# Patient Record
Sex: MALE | Race: WHITE | NOT HISPANIC OR LATINO | URBAN - METROPOLITAN AREA
[De-identification: names, ages, dates, MRNs, and addresses within clinical notes are randomized per-mention and may not be internally consistent; named-entity substitution may affect disease eponyms.]

---

## 2024-09-20 PROBLEM — Z00.00 ENCOUNTER FOR PREVENTIVE HEALTH EXAMINATION: Status: ACTIVE | Noted: 2024-09-20

## 2024-09-23 ENCOUNTER — OUTPATIENT (OUTPATIENT)
Dept: OUTPATIENT SERVICES | Facility: HOSPITAL | Age: 64
LOS: 1 days | End: 2024-09-23
Payer: MEDICARE

## 2024-09-23 ENCOUNTER — APPOINTMENT (OUTPATIENT)
Dept: MRI IMAGING | Facility: CLINIC | Age: 64
End: 2024-09-23
Payer: MEDICARE

## 2024-09-23 DIAGNOSIS — Z00.8 ENCOUNTER FOR OTHER GENERAL EXAMINATION: ICD-10-CM

## 2024-09-23 PROCEDURE — 76498 UNLISTED MR PROCEDURE: CPT

## 2024-09-23 PROCEDURE — 72197 MRI PELVIS W/O & W/DYE: CPT | Mod: MH

## 2024-09-23 PROCEDURE — 72197 MRI PELVIS W/O & W/DYE: CPT | Mod: 26,MH

## 2024-09-23 PROCEDURE — 76498P: CUSTOM | Mod: 26,MH

## 2024-09-23 PROCEDURE — A9585: CPT

## 2024-09-30 PROBLEM — Z98.890 HISTORY OF RADIOFREQUENCY ABLATION PROCEDURE FOR CARDIAC ARRHYTHMIA: Status: ACTIVE | Noted: 2024-09-30

## 2024-09-30 PROBLEM — N28.9 RENAL INSUFFICIENCY: Status: ACTIVE | Noted: 2024-09-30

## 2024-09-30 PROBLEM — N40.1 BPH WITH OBSTRUCTION/LOWER URINARY TRACT SYMPTOMS: Status: ACTIVE | Noted: 2024-09-30

## 2024-09-30 PROBLEM — I48.91 AFIB: Status: ACTIVE | Noted: 2024-09-30

## 2024-09-30 PROBLEM — Z80.6 FAMILY HISTORY OF LEUKEMIA: Status: ACTIVE | Noted: 2024-09-30

## 2024-09-30 PROBLEM — Z78.9 SOCIAL ALCOHOL USE: Status: ACTIVE | Noted: 2024-09-30

## 2024-10-01 ENCOUNTER — APPOINTMENT (OUTPATIENT)
Dept: INTERVENTIONAL RADIOLOGY/VASCULAR | Facility: CLINIC | Age: 64
End: 2024-10-01

## 2024-10-01 DIAGNOSIS — K21.9 GASTRO-ESOPHAGEAL REFLUX DISEASE W/OUT ESOPHAGITIS: ICD-10-CM

## 2024-10-01 DIAGNOSIS — N13.8 BENIGN PROSTATIC HYPERPLASIA WITH LOWER URINARY TRACT SYMPMS: ICD-10-CM

## 2024-10-01 DIAGNOSIS — N40.1 BENIGN PROSTATIC HYPERPLASIA WITH LOWER URINARY TRACT SYMPMS: ICD-10-CM

## 2024-10-01 DIAGNOSIS — E11.9 TYPE 2 DIABETES MELLITUS W/OUT COMPLICATIONS: ICD-10-CM

## 2024-10-01 DIAGNOSIS — Z79.01 LONG TERM (CURRENT) USE OF ANTICOAGULANTS: ICD-10-CM

## 2024-10-01 DIAGNOSIS — Z80.6 FAMILY HISTORY OF LEUKEMIA: ICD-10-CM

## 2024-10-01 DIAGNOSIS — Z98.890 OTHER SPECIFIED POSTPROCEDURAL STATES: ICD-10-CM

## 2024-10-01 DIAGNOSIS — I48.91 UNSPECIFIED ATRIAL FIBRILLATION: ICD-10-CM

## 2024-10-01 DIAGNOSIS — I10 ESSENTIAL (PRIMARY) HYPERTENSION: ICD-10-CM

## 2024-10-01 DIAGNOSIS — N28.9 DISORDER OF KIDNEY AND URETER, UNSPECIFIED: ICD-10-CM

## 2024-10-01 DIAGNOSIS — R33.9 RETENTION OF URINE, UNSPECIFIED: ICD-10-CM

## 2024-10-01 DIAGNOSIS — Z78.9 OTHER SPECIFIED HEALTH STATUS: ICD-10-CM

## 2024-10-01 DIAGNOSIS — Z97.8 PRESENCE OF OTHER SPECIFIED DEVICES: ICD-10-CM

## 2024-10-01 RX ORDER — SULFAMETHOXAZOLE AND TRIMETHOPRIM 400; 80 MG/1; MG/1
TABLET ORAL
Refills: 0 | Status: ACTIVE | COMMUNITY

## 2024-10-01 RX ORDER — OMEPRAZOLE 20 MG/1
20 TABLET, DELAYED RELEASE ORAL
Refills: 0 | Status: ACTIVE | COMMUNITY

## 2024-10-01 RX ORDER — SILDENAFIL 50 MG/1
50 TABLET ORAL
Refills: 0 | Status: ACTIVE | COMMUNITY

## 2024-10-01 RX ORDER — TAMSULOSIN HYDROCHLORIDE 0.4 MG/1
0.4 CAPSULE ORAL
Refills: 0 | Status: ACTIVE | COMMUNITY

## 2024-10-01 RX ORDER — APIXABAN 5 MG/1
5 TABLET, FILM COATED ORAL
Refills: 0 | Status: ACTIVE | COMMUNITY

## 2024-10-01 RX ORDER — SOLIFENACIN SUCCINATE 5 MG/1
5 TABLET ORAL
Refills: 0 | Status: ACTIVE | COMMUNITY

## 2024-10-01 RX ORDER — AMLODIPINE BESYLATE AND BENAZEPRIL HYDROCHLORIDE 10; 40 MG/1; MG/1
10-40 CAPSULE ORAL
Refills: 0 | Status: ACTIVE | COMMUNITY

## 2024-10-01 RX ORDER — SITAGLIPTIN 100 MG/1
100 TABLET, FILM COATED ORAL
Refills: 0 | Status: ACTIVE | COMMUNITY

## 2024-10-01 RX ORDER — METFORMIN HYDROCHLORIDE 500 MG/1
500 TABLET, COATED ORAL
Refills: 0 | Status: ACTIVE | COMMUNITY

## 2024-10-01 RX ORDER — METOPROLOL SUCCINATE 100 MG/1
100 TABLET, EXTENDED RELEASE ORAL
Refills: 0 | Status: ACTIVE | COMMUNITY

## 2024-10-01 NOTE — DATA REVIEWED
[FreeTextEntry1] : [facility] Imaging at [facility] of [facility] Department of Radiology Radiology Report Patient Name:	TRACY BOTELLO		Report Date:	24-Sep-2024 11:30.00 Patient ID:	[id number] ([id number]), [id number] ([id number])		Accession No.:	[id number] Patient Birth Date:	1960		Report Status:	F Referring Physician:	[id number] GOLDBERG GARY D		Reason For Study:	eval   EXAM: [id number] - MR CAD PROSTATE - ORDERED BY: GARY D GOLDBERG  EXAM: [id number] - MR PROSTATE WAW IC - ORDERED BY: GARY D GOLDBERG   PROCEDURE DATE: 09/23/2024  INTERPRETATION: CLINICAL INFORMATION: Elevated PSA. PSA: Unknown ng/mL Prior prostate biopsy: Unknown  COMPARISON: None.  CONTRAST/COMPLICATIONS: IV Contrast: Gadavist 10 cc administered 0 cc discarded Oral Contrast: NONE Complications: None reported at time of study completion  PROCEDURE: 3.0T multiparametric MRI of the pelvis was performed as per Prostate Cancer Protocol including small field-of-view, thin section T2-weighted imaging, diffusion-weighted imaging (including ultra high b-values), and dynamic contrast-enhanced imaging.  Computer aided detection/diagnosis was performed using kinetic enhancement analysis on a dedicated [employer] workstation performed by the interpreting radiologist.  FINDINGS:  Size: 6.3 x 5 x 6.9 [transverse x AP x CC] cm. Volume: 113.8 cc . PSA density: Unknown ng/mL/mL PSA density >0.15 ng/mL/mL: Unknown Hemorrhage: None.  Central gland: Central prostatic hypertrophy with intravesical protrusion of BPH. Peripheral zone: No focal finding. Multiple linear/wedge-shaped areas of T2 hypointense signal without associated restricted diffusion, likely inflammatory.  MRI Targets: No MRI targetable lesion.  Neurovascular bundle: No evidence of neurovascular bundle invasion. Seminal vesicles: No seminal vesicle invasion. Lymph nodes: No pelvic adenopathy. Bones: No suspicious lesions identified. Urinary bladder: Mucosal enhancement. Aragon catheter terminating in the bladder. Other: None.  IMPRESSION: No MRI targetable lesions.  Multiple linear/wedge-shaped areas of T2 hypointense signal without associated restricted diffusion, likely inflammatory. PIRADS 2 - Low (clinically significant cancer is unlikely to be present)  Prostate Volume: 113.8 mL  --- End of Report ---      AMMON CLARK DO; Resident Radiologist This document has been electronically signed. MARIA VICTORIA ANTONIO MD; Attending Radiologist This document has been electronically signed. Sep 24 2024 11:30AM

## 2024-10-01 NOTE — PHYSICAL EXAM
[Alert] : alert [No Acute Distress] : no acute distress [Well Nourished] : well nourished [No Respiratory Distress] : no respiratory distress [Oriented x3] : oriented to person, place, and time [Normal Insight/Judgement] : insight and judgment were intact [Normal Affect] : the affect was normal [Normal Mood] : the mood was normal [Recent Memory Normal] : recent memory was not impaired [Remote Memory Normal] : remote memory was not impaired [Restricted in physically strenuous activity but ambulatory and able to carry out work of a light or sedentary nature] : Restricted in physically strenuous activity but ambulatory and able to carry out work of a light or sedentary nature, e.g., light house work, office work

## 2024-10-01 NOTE — ASSESSMENT
[Other: _____] : [unfilled] [FreeTextEntry1] : Mr. MOLINA is a 63 y/o male with hx of symptomatic BPH presenting for PAE consultation.   Given Mr. MOLINA's urologic symptomatology secondary to BPH, performing Prostate Artery Embolization, a minimally invasive intravascular procedure, may result in significant improvement of his symptoms.   IPSS score was 14 prior to piña   # Symptomatic BPH with LUTS / Obstructive Symptoms - states BPH s/s now with retention since cardiac ablation in July are compromising his quality of life - 9/23/24 .8 cc volume - referred for outpatient PAE by Dr Goldberg - PAE procedural risks, benefits & alternatives were discussed at great length with Mr. MOLINA, inclusive of possible non-targeted embolization (bladder / penis / rectum), post embolization syndrome which may be comprised of flu-like s/s, low grade fever, n/v, pelvic discomfort lasting up to 1 week, penile tip burning after piña catheter removal which may last a few days or 1-2 episodes of gross hematuria with or without clot passage that may occur monthly as the prostate remodels - performed with sedation from the anesthesia team - Your LUTS may worsen the first week post PAE 2/2 the inflammatory phase of healing - You may also have transitory blood-tinged urine, semen or bowel movements initially -  You may also experience acute urinary retention which may require piña catheter reinsertion - 9/23/24 MRI to be reviewed to evaluate pelvic vasculature for PAE planning - You will be discharged home after a 1- 2 hr PACU stay - an access site closure device (MRI compatible permanent Celt or absorbable Mynx) may be utilized to aid in mitigating postprocedural access site bleeding - You will be discharged with an indwelling piña catheter with post op f/u & mgmt with your referring urologist - pt reports renal insufficiency hx- no Ibuprofen post op 2/2 a/c usage & renal insufficiency  - no Pyridium 2/2 renal insufficiency hx - no Medrol post op 2/2 DM hx - BPH medication regimen, PSA monitoring & repeat imaging post PAE as per your urologist - IR follow up consults with IPSS assessment s/p PAE will be 1 month post PAE - additional follow ups to be determined as clinically indicated   # Renal Insufficiency - baseline renal function: no labs available for review & pt unaware - managed by PCP Dr Trista Stevens - I reviewed the risk of ERICKSON and discussed prophylactic measures, including IV hydration, minimizing contrast administration, and adjustment of medication regimen - discussed approx 50 ml of iodinated contrast is utilized - will require ERICKSON prophylaxis parameters for IV hydration from Dr Stevens - may provide gentle IV hydration for ERICKSON prophylaxis - 0.9% NS @ 1 ml/kg/hr to be initiated   hr pre op through  hr post op pending PST lab results - bmp eval 48-72 hrs post procedure  # T2DM   - A1C eval as per PST - managed with Metformin & Januvia - briefly reviewed hold parameters for both - hold pre op dos - PST to discuss in depth - pre op medication dosing modification / hold as per PST / Managing MD - instructed to hold DM meds pre op dos - FBS pre op dos  # AFIB - denies cardiac symptomatology - managed by Dr Pantoja / Dr Gross  - hx of ablation 7/2024 - on Eliquis regimen - will discuss feasibility of holding Eliquis 48 hrs pre op to aid in mitigating bleeding risk with cardiologist - bkg office to request most recent cardiac eval & tests for review   # HTN - pre op antihypertensives as per PST  # GERD - on Omeprazole regimen - pre op dos GERD med(s) as per PST / Anesthesia    .& Mrs. MOLINA's comprehension was confirmed & all questions were asked & answered to their satisfaction. Mr. MOLINA expressed that he would like to move forward with PAE.   IR contact information was provided to Mr. MOLINA & he was informed that the IR booking office will contact him to schedule PSTs & PAE procedure.

## 2024-10-01 NOTE — HISTORY OF PRESENT ILLNESS
[Home] : at home, [unfilled] , at the time of the visit. [Medical Office: ([facility])___] : at the medical office located in  [Spouse] : spouse [Verbal consent obtained from patient] : the patient, [unfilled] [0] : ~His/Her~ pain was 0 out of 10 [FreeTextEntry1] : Mr. MOLINA is a 63 y/o male with hx of AFIB s/p ablation 7/2024 on Eliquis, T2DM, HTN, renal insufficiency, HLD, GERD, symptomatic BPH refractory to conservative management, now with retention since cardiac ablation, who presents to IR today for PAE consultation, as referred by Dr Goldberg.  Mr. MOLINA reports BPH s/s now with retention since cardiac ablation in July are compromising his quality of life. Prior to retention, had nocturia x 4, urgency, frequency, hesitancy. Next routine piña exchange is 10/4.  MRI Prostate 9/23/24: 113.8 cc volume   Dr Gary Goldberg / Dr Fidencio Cisse in [place] [phone] Cardiac Dr Gross [phone] Interventional Cardiac Dr Nikhil Pantoja [phone] PCP Dr Trista Stevens [phone] - manages global health, DM, renal function

## 2024-10-01 NOTE — ADDENDUM
[FreeTextEntry1] : 10/1/24 3:40 pm - per cardiac NP Ramiro from dr Pantoja's office, pt may hold Eliquis 48 hrs. IR & SS orders updated. JC MCNAMARA-BC

## 2024-10-03 ENCOUNTER — NON-APPOINTMENT (OUTPATIENT)
Age: 64
End: 2024-10-03

## 2024-10-08 ENCOUNTER — OUTPATIENT (OUTPATIENT)
Dept: OUTPATIENT SERVICES | Facility: HOSPITAL | Age: 64
LOS: 1 days | End: 2024-10-08
Payer: MEDICARE

## 2024-10-08 VITALS
OXYGEN SATURATION: 96 % | RESPIRATION RATE: 18 BRPM | DIASTOLIC BLOOD PRESSURE: 67 MMHG | HEIGHT: 74 IN | WEIGHT: 230.82 LBS | SYSTOLIC BLOOD PRESSURE: 100 MMHG | HEART RATE: 85 BPM | TEMPERATURE: 98 F

## 2024-10-08 DIAGNOSIS — E11.9 TYPE 2 DIABETES MELLITUS WITHOUT COMPLICATIONS: ICD-10-CM

## 2024-10-08 DIAGNOSIS — Z01.818 ENCOUNTER FOR OTHER PREPROCEDURAL EXAMINATION: ICD-10-CM

## 2024-10-08 DIAGNOSIS — Z98.890 OTHER SPECIFIED POSTPROCEDURAL STATES: Chronic | ICD-10-CM

## 2024-10-08 DIAGNOSIS — N40.1 BENIGN PROSTATIC HYPERPLASIA WITH LOWER URINARY TRACT SYMPTOMS: ICD-10-CM

## 2024-10-08 DIAGNOSIS — Z79.01 LONG TERM (CURRENT) USE OF ANTICOAGULANTS: ICD-10-CM

## 2024-10-08 LAB
A1C WITH ESTIMATED AVERAGE GLUCOSE RESULT: 5.4 % — SIGNIFICANT CHANGE UP (ref 4–5.6)
ANION GAP SERPL CALC-SCNC: 14 MMOL/L — SIGNIFICANT CHANGE UP (ref 5–17)
BUN SERPL-MCNC: 20 MG/DL — SIGNIFICANT CHANGE UP (ref 7–23)
CALCIUM SERPL-MCNC: 9.4 MG/DL — SIGNIFICANT CHANGE UP (ref 8.4–10.5)
CHLORIDE SERPL-SCNC: 101 MMOL/L — SIGNIFICANT CHANGE UP (ref 96–108)
CO2 SERPL-SCNC: 19 MMOL/L — LOW (ref 22–31)
CREAT SERPL-MCNC: 1.35 MG/DL — HIGH (ref 0.5–1.3)
EGFR: 58 ML/MIN/1.73M2 — LOW
ESTIMATED AVERAGE GLUCOSE: 108 MG/DL — SIGNIFICANT CHANGE UP (ref 68–114)
GLUCOSE SERPL-MCNC: 167 MG/DL — HIGH (ref 70–99)
HCT VFR BLD CALC: 39.8 % — SIGNIFICANT CHANGE UP (ref 39–50)
HGB BLD-MCNC: 14.1 G/DL — SIGNIFICANT CHANGE UP (ref 13–17)
MCHC RBC-ENTMCNC: 34.1 PG — HIGH (ref 27–34)
MCHC RBC-ENTMCNC: 35.4 GM/DL — SIGNIFICANT CHANGE UP (ref 32–36)
MCV RBC AUTO: 96.4 FL — SIGNIFICANT CHANGE UP (ref 80–100)
NRBC # BLD: 0 /100 WBCS — SIGNIFICANT CHANGE UP (ref 0–0)
PLATELET # BLD AUTO: 116 K/UL — LOW (ref 150–400)
POTASSIUM SERPL-MCNC: 4.8 MMOL/L — SIGNIFICANT CHANGE UP (ref 3.5–5.3)
POTASSIUM SERPL-SCNC: 4.8 MMOL/L — SIGNIFICANT CHANGE UP (ref 3.5–5.3)
RBC # BLD: 4.13 M/UL — LOW (ref 4.2–5.8)
RBC # FLD: 12.8 % — SIGNIFICANT CHANGE UP (ref 10.3–14.5)
SODIUM SERPL-SCNC: 134 MMOL/L — LOW (ref 135–145)
WBC # BLD: 5.15 K/UL — SIGNIFICANT CHANGE UP (ref 3.8–10.5)
WBC # FLD AUTO: 5.15 K/UL — SIGNIFICANT CHANGE UP (ref 3.8–10.5)

## 2024-10-08 NOTE — H&P PST ADULT - OTHER CARE PROVIDERS
Cardiologist- Dr. Nikhil Pantoja [phone] - last seen 8/2024 & Dr. Nikhil Gross [phone] : last seen  5/2024; Urologist-  Dr. Goldberg [phone]- last seen 9/20/2024; Dr. Fidencio Cisse [phone] Rinvoq Pregnancy And Lactation Text: Based on animal studies, Rinvoq may cause embryo-fetal harm when administered to pregnant women.  The medication should not be used in pregnancy.  Breastfeeding is not recommended during treatment and for 6 days after the last dose.

## 2024-10-08 NOTE — H&P PST ADULT - PROBLEM SELECTOR PLAN 1
Plan for PAE on 10/16/2024.  Pre-op labs sent- CBC, BMP, Hgb A1c  Pre-op instructions given to patient. All questions answered.

## 2024-10-08 NOTE — H&P PST ADULT - WHEN WAS YOUR LAST VACCINATION? YEAR
Detail Level: Detailed Field Number: 1 Lesion Dimensions-X Axis In Cm: 0.8 Lesion Margin Size In Cm: 0.5 Shield Size In Cm: 2.0x2.0 Applicator Size In Cm: 2.0 cm Energy (Kv): 70 Treatment Time (Min): 0.42 Time, Dose, Fractionation Factor (Tdf) For Prescription 1: 96 Daily Dose (Cgy): 270.9 Number Of Fractions For Prescription 1: 20 Treatments Per Week: 3 Total Dose For Prescription 1 (Cgy): 5418.0 Add A Second Prescription?: No Additional Fraction(S) Needed:: 0 Bill For Physics Consultation: No - Render Text Only Physics Documentation: (Physics Check Verbiage) 2022

## 2024-10-08 NOTE — H&P PST ADULT - NSICDXPASTMEDICALHX_GEN_ALL_CORE_FT
PAST MEDICAL HISTORY:  Atrial fibrillation     Benign essential HTN     BPH with obstruction/lower urinary tract symptoms     Current use of anticoagulant therapy     Enlarged prostate with lower urinary tract symptoms (LUTS)     GERD (gastroesophageal reflux disease)     H/O cataract     History of cardioversion     History of radiofrequency ablation procedure for cardiac arrhythmia     History of retinal tear     Indwelling urinary catheter present     Renal insufficiency     T2DM (type 2 diabetes mellitus)     Urinary retention

## 2024-10-08 NOTE — H&P PST ADULT - HISTORY OF PRESENT ILLNESS
This is a 64 year-old male with a PMH of AFib (hx. of cardioversion and more recent ablation 7/2024, on Eliquis), essential HTN, T2DM (controlled with Metformin and Januvia), GERD, renal insufficiency, BPH with lower urinary tract symptoms, UTI (8/2024; on Bactrim daily), presents to PST for a planned PAE on 10/16/2024. Denies recent fever, chills, chest pain, SOB, changes in bowel/urinary habits or recent exposure to COVID-19 infection. Pt. denies clotting or bleeding disorders.

## 2024-10-08 NOTE — H&P PST ADULT - NSICDXFAMILYHX_GEN_ALL_CORE_FT
FAMILY HISTORY:  Father  Still living? Unknown  Family history of leukemia, Age at diagnosis: Age Unknown

## 2024-10-08 NOTE — H&P PST ADULT - NSICDXPASTSURGICALHX_GEN_ALL_CORE_FT
PAST SURGICAL HISTORY:  H/O colonoscopy     H/O wrist surgery     History of removal of neck cyst

## 2024-10-08 NOTE — H&P PST ADULT - ASSESSMENT
Activity: plays golf, walks once per week, endorses walking 2 flights of stairs without SOB    DASI scale: 4.64    Mallampati: 3    Dentition: Upper bridge (not removable), caps

## 2024-10-15 PROBLEM — Z98.890 OTHER SPECIFIED POSTPROCEDURAL STATES: Chronic | Status: ACTIVE | Noted: 2024-10-08

## 2024-10-15 PROBLEM — N40.1 BENIGN PROSTATIC HYPERPLASIA WITH LOWER URINARY TRACT SYMPTOMS: Chronic | Status: ACTIVE | Noted: 2024-10-08

## 2024-10-15 PROBLEM — I48.91 UNSPECIFIED ATRIAL FIBRILLATION: Chronic | Status: ACTIVE | Noted: 2024-10-08

## 2024-10-15 PROBLEM — Z86.69 PERSONAL HISTORY OF OTHER DISEASES OF THE NERVOUS SYSTEM AND SENSE ORGANS: Chronic | Status: ACTIVE | Noted: 2024-10-08

## 2024-10-15 PROBLEM — N28.9 DISORDER OF KIDNEY AND URETER, UNSPECIFIED: Chronic | Status: ACTIVE | Noted: 2024-10-08

## 2024-10-15 PROBLEM — E11.9 TYPE 2 DIABETES MELLITUS WITHOUT COMPLICATIONS: Chronic | Status: ACTIVE | Noted: 2024-10-08

## 2024-10-15 PROBLEM — Z92.89 PERSONAL HISTORY OF OTHER MEDICAL TREATMENT: Chronic | Status: ACTIVE | Noted: 2024-10-08

## 2024-10-15 PROBLEM — K21.9 GASTRO-ESOPHAGEAL REFLUX DISEASE WITHOUT ESOPHAGITIS: Chronic | Status: ACTIVE | Noted: 2024-10-08

## 2024-10-15 PROBLEM — I10 ESSENTIAL (PRIMARY) HYPERTENSION: Chronic | Status: ACTIVE | Noted: 2024-10-08

## 2024-10-15 PROBLEM — R33.9 RETENTION OF URINE, UNSPECIFIED: Chronic | Status: ACTIVE | Noted: 2024-10-08

## 2024-10-15 PROBLEM — Z96.0 PRESENCE OF UROGENITAL IMPLANTS: Chronic | Status: ACTIVE | Noted: 2024-10-08

## 2024-10-15 PROBLEM — Z79.01 LONG TERM (CURRENT) USE OF ANTICOAGULANTS: Chronic | Status: ACTIVE | Noted: 2024-10-08

## 2024-10-16 ENCOUNTER — RESULT REVIEW (OUTPATIENT)
Age: 64
End: 2024-10-16

## 2024-10-16 ENCOUNTER — TRANSCRIPTION ENCOUNTER (OUTPATIENT)
Age: 64
End: 2024-10-16

## 2024-10-16 ENCOUNTER — OUTPATIENT (OUTPATIENT)
Dept: OUTPATIENT SERVICES | Facility: HOSPITAL | Age: 64
LOS: 1 days | End: 2024-10-16
Payer: MEDICARE

## 2024-10-16 VITALS
RESPIRATION RATE: 18 BRPM | HEART RATE: 77 BPM | OXYGEN SATURATION: 98 % | DIASTOLIC BLOOD PRESSURE: 84 MMHG | TEMPERATURE: 99 F | SYSTOLIC BLOOD PRESSURE: 124 MMHG

## 2024-10-16 VITALS
HEART RATE: 78 BPM | RESPIRATION RATE: 17 BRPM | DIASTOLIC BLOOD PRESSURE: 85 MMHG | OXYGEN SATURATION: 100 % | SYSTOLIC BLOOD PRESSURE: 135 MMHG

## 2024-10-16 DIAGNOSIS — E11.9 TYPE 2 DIABETES MELLITUS WITHOUT COMPLICATIONS: ICD-10-CM

## 2024-10-16 DIAGNOSIS — Z79.01 LONG TERM (CURRENT) USE OF ANTICOAGULANTS: ICD-10-CM

## 2024-10-16 DIAGNOSIS — Z98.890 OTHER SPECIFIED POSTPROCEDURAL STATES: Chronic | ICD-10-CM

## 2024-10-16 DIAGNOSIS — N40.1 BENIGN PROSTATIC HYPERPLASIA WITH LOWER URINARY TRACT SYMPTOMS: ICD-10-CM

## 2024-10-16 DIAGNOSIS — Z97.8 PRESENCE OF OTHER SPECIFIED DEVICES: ICD-10-CM

## 2024-10-16 DIAGNOSIS — R33.9 RETENTION OF URINE, UNSPECIFIED: ICD-10-CM

## 2024-10-16 PROCEDURE — 37243 VASC EMBOLIZE/OCCLUDE ORGAN: CPT

## 2024-10-16 PROCEDURE — 36247 INS CATH ABD/L-EXT ART 3RD: CPT | Mod: 50

## 2024-10-16 PROCEDURE — 76937 US GUIDE VASCULAR ACCESS: CPT | Mod: 26

## 2024-10-16 RX ORDER — TAMSULOSIN HCL 0.4 MG
1 CAPSULE ORAL
Refills: 0 | DISCHARGE

## 2024-10-16 RX ORDER — APIXABAN 5 MG/1
1 TABLET, FILM COATED ORAL
Refills: 0 | DISCHARGE

## 2024-10-16 RX ORDER — SODIUM CHLORIDE 0.9 % (FLUSH) 0.9 %
500 SYRINGE (ML) INJECTION
Refills: 0 | Status: DISCONTINUED | OUTPATIENT
Start: 2024-10-16 | End: 2024-10-30

## 2024-10-16 RX ORDER — SODIUM CHLORIDE IRRIG SOLUTION 0.9 %
1000 SOLUTION, IRRIGATION IRRIGATION
Refills: 0 | Status: DISCONTINUED | OUTPATIENT
Start: 2024-10-16 | End: 2024-10-30

## 2024-10-16 RX ORDER — METOPROLOL TARTRATE 50 MG
1 TABLET ORAL
Refills: 0 | DISCHARGE

## 2024-10-16 RX ORDER — AMLODIPINE BESYLATE AND BENAZEPRIL HYDROCHLORIDE 10; 20 MG/1; MG/1
1 CAPSULE ORAL
Refills: 0 | DISCHARGE

## 2024-10-16 RX ORDER — SOLIFENACIN SUCCINATE 10 MG/1
1 TABLET, FILM COATED ORAL
Refills: 0 | DISCHARGE

## 2024-10-16 RX ORDER — METFORMIN HCL 500 MG
1 TABLET ORAL
Refills: 0 | DISCHARGE

## 2024-10-16 RX ORDER — SITAGLIPTIN PHOSPHATE 100 MG
1 TABLET ORAL
Refills: 0 | DISCHARGE

## 2024-10-16 RX ORDER — SILDENAFIL 50 MG/1
1 TABLET, FILM COATED ORAL
Refills: 0 | DISCHARGE

## 2024-10-16 RX ADMIN — Medication 100 MILLILITER(S): at 11:52

## 2024-10-16 NOTE — ASU DISCHARGE PLAN (ADULT/PEDIATRIC) - FINANCIAL ASSISTANCE
[facility] provides services at a reduced cost to those who are determined to be eligible through [facility]’s financial assistance program. Information regarding [facility]’s financial assistance program can be found by going to [email or web address] or by calling [phone].

## 2024-10-16 NOTE — ASU PATIENT PROFILE, ADULT - FALL HARM RISK - HARM RISK INTERVENTIONS

## 2024-10-16 NOTE — ASU DISCHARGE PLAN (ADULT/PEDIATRIC) - ASU DC SPECIAL INSTRUCTIONSFT
You underwent a Prostate Artery Embolization (PAE) procedure with Dr. Wild on 10/16.    - You may experience PAE Syndrome for the next few days for up to 1 week, which may consist of a low grade fever (below 101 F), flu-like symptoms, body aches, nausea, vomiting, pelvic pressure / pain, painful urination - this is normal. If you were prescribed post - op medications, take them as prescribed for symptom relief.     - Urine, semen or bowel movements may be blood-tinged for the next few days to weeks - this is normal.    - You may experience 1-2 incidents of blood in your urine with or without the passage of clots, which may last a couple of days per incident, over the next couple of months - this is normal as your prostate remodels.    Follow - Up Requirements:  -  Please call your urologist to schedule a follow up appointment.  This appointment is typically 1 - 2 WEEKS after your PAE. If you were discharged with a piña catheter, your urologist will determine when it can be removed. Your urologist will also manage your prostate prescriptions, if you are currently on medications.    - Please call the Interventional Radiology office @ [phone] to schedule a ONE MONTH follow up consultation with your IR Doctor. Due to the current pandemic, this may be conducted via Telehealth services, for your safety.      Should you have any questions or concerns regarding the above, please call the IR Nurse Practitioner at [phone] or [phone] Mon - Fri 8 am -4 pm.      ***If you develop a problem that you believe requires IMMEDIATE attention, please go to your NEAREST Emergency Room or call 911***     *If you believe your problem can safely wait until you speak to a Doctor, please call your Urologist & IR at [phone]. After 6 pm on weekdays, or on weekends or holidays, please call [facility] at [phone] and ask for the" Interventional Radiology Resident on call"*  Symptoms to Report include:  Inability to urinate once you are discharged, develop a fever above 101, prolonged burning or painful urination, foul-smelling urine, inability to urinate, more than 1-2 episodes of blood in your urine in a month / right groin access site pain, bleeding, bruising that spreads or hardened area below the skin / right leg numbness, tingling or pain.

## 2024-10-17 ENCOUNTER — APPOINTMENT (OUTPATIENT)
Dept: INTERVENTIONAL RADIOLOGY/VASCULAR | Facility: CLINIC | Age: 64
End: 2024-10-17

## 2024-10-21 ENCOUNTER — NON-APPOINTMENT (OUTPATIENT)
Age: 64
End: 2024-10-21

## 2024-10-25 ENCOUNTER — NON-APPOINTMENT (OUTPATIENT)
Age: 64
End: 2024-10-25

## 2024-10-25 DIAGNOSIS — E87.5 HYPERKALEMIA: ICD-10-CM

## 2024-11-06 ENCOUNTER — NON-APPOINTMENT (OUTPATIENT)
Age: 64
End: 2024-11-06

## 2024-11-11 ENCOUNTER — APPOINTMENT (OUTPATIENT)
Dept: INTERVENTIONAL RADIOLOGY/VASCULAR | Facility: CLINIC | Age: 64
End: 2024-11-11

## 2024-11-11 DIAGNOSIS — Z97.8 PRESENCE OF OTHER SPECIFIED DEVICES: ICD-10-CM

## 2024-11-11 DIAGNOSIS — R33.9 RETENTION OF URINE, UNSPECIFIED: ICD-10-CM

## 2024-11-11 DIAGNOSIS — N40.1 BENIGN PROSTATIC HYPERPLASIA WITH LOWER URINARY TRACT SYMPMS: ICD-10-CM

## 2024-11-11 DIAGNOSIS — N13.8 BENIGN PROSTATIC HYPERPLASIA WITH LOWER URINARY TRACT SYMPMS: ICD-10-CM

## 2024-11-11 PROCEDURE — 99214 OFFICE O/P EST MOD 30 MIN: CPT

## 2024-11-20 PROCEDURE — 80048 BASIC METABOLIC PNL TOTAL CA: CPT

## 2024-11-20 PROCEDURE — C1889: CPT

## 2024-11-20 PROCEDURE — 76937 US GUIDE VASCULAR ACCESS: CPT

## 2024-11-20 PROCEDURE — C1769: CPT

## 2024-11-20 PROCEDURE — 85027 COMPLETE CBC AUTOMATED: CPT

## 2024-11-20 PROCEDURE — 83036 HEMOGLOBIN GLYCOSYLATED A1C: CPT

## 2024-11-20 PROCEDURE — G0463: CPT

## 2024-11-20 PROCEDURE — C1894: CPT

## 2024-11-20 PROCEDURE — 37243 VASC EMBOLIZE/OCCLUDE ORGAN: CPT

## 2024-11-20 PROCEDURE — C1760: CPT

## 2024-11-20 PROCEDURE — C1887: CPT

## 2024-11-20 PROCEDURE — 36247 INS CATH ABD/L-EXT ART 3RD: CPT | Mod: XS

## 2024-11-22 ENCOUNTER — APPOINTMENT (OUTPATIENT)
Dept: UROLOGY | Facility: CLINIC | Age: 64
End: 2024-11-22

## 2024-11-27 ENCOUNTER — NON-APPOINTMENT (OUTPATIENT)
Age: 64
End: 2024-11-27

## 2024-12-10 ENCOUNTER — TRANSCRIPTION ENCOUNTER (OUTPATIENT)
Age: 64
End: 2024-12-10

## 2024-12-17 ENCOUNTER — APPOINTMENT (OUTPATIENT)
Dept: INTERVENTIONAL RADIOLOGY/VASCULAR | Facility: CLINIC | Age: 64
End: 2024-12-17

## 2024-12-17 DIAGNOSIS — N13.8 BENIGN PROSTATIC HYPERPLASIA WITH LOWER URINARY TRACT SYMPMS: ICD-10-CM

## 2024-12-17 DIAGNOSIS — N40.1 BENIGN PROSTATIC HYPERPLASIA WITH LOWER URINARY TRACT SYMPMS: ICD-10-CM

## 2024-12-17 PROCEDURE — 99215 OFFICE O/P EST HI 40 MIN: CPT
